# Patient Record
Sex: FEMALE | Race: BLACK OR AFRICAN AMERICAN | NOT HISPANIC OR LATINO | URBAN - METROPOLITAN AREA
[De-identification: names, ages, dates, MRNs, and addresses within clinical notes are randomized per-mention and may not be internally consistent; named-entity substitution may affect disease eponyms.]

---

## 2020-10-10 ENCOUNTER — EMERGENCY (EMERGENCY)
Facility: HOSPITAL | Age: 20
LOS: 1 days | Discharge: HOME | End: 2020-10-10
Attending: EMERGENCY MEDICINE | Admitting: EMERGENCY MEDICINE
Payer: COMMERCIAL

## 2020-10-10 VITALS
OXYGEN SATURATION: 98 % | RESPIRATION RATE: 18 BRPM | WEIGHT: 110.01 LBS | SYSTOLIC BLOOD PRESSURE: 127 MMHG | TEMPERATURE: 98 F | HEART RATE: 92 BPM | DIASTOLIC BLOOD PRESSURE: 70 MMHG

## 2020-10-10 VITALS
HEART RATE: 95 BPM | RESPIRATION RATE: 18 BRPM | TEMPERATURE: 98 F | DIASTOLIC BLOOD PRESSURE: 77 MMHG | OXYGEN SATURATION: 95 % | SYSTOLIC BLOOD PRESSURE: 113 MMHG

## 2020-10-10 DIAGNOSIS — N93.9 ABNORMAL UTERINE AND VAGINAL BLEEDING, UNSPECIFIED: ICD-10-CM

## 2020-10-10 DIAGNOSIS — R10.2 PELVIC AND PERINEAL PAIN: ICD-10-CM

## 2020-10-10 LAB
ALBUMIN SERPL ELPH-MCNC: 4.5 G/DL — SIGNIFICANT CHANGE UP (ref 3.3–5)
ALP SERPL-CCNC: 61 U/L — SIGNIFICANT CHANGE UP (ref 40–120)
ALT FLD-CCNC: 18 U/L — SIGNIFICANT CHANGE UP (ref 10–45)
ANION GAP SERPL CALC-SCNC: 13 MMOL/L — SIGNIFICANT CHANGE UP (ref 5–17)
APPEARANCE UR: CLEAR — SIGNIFICANT CHANGE UP
AST SERPL-CCNC: 24 U/L — SIGNIFICANT CHANGE UP (ref 10–40)
BASOPHILS # BLD AUTO: 0.03 K/UL — SIGNIFICANT CHANGE UP (ref 0–0.2)
BASOPHILS NFR BLD AUTO: 0.6 % — SIGNIFICANT CHANGE UP (ref 0–2)
BILIRUB SERPL-MCNC: 0.3 MG/DL — SIGNIFICANT CHANGE UP (ref 0.2–1.2)
BILIRUB UR-MCNC: NEGATIVE — SIGNIFICANT CHANGE UP
BUN SERPL-MCNC: 8 MG/DL — SIGNIFICANT CHANGE UP (ref 7–23)
CALCIUM SERPL-MCNC: 9.2 MG/DL — SIGNIFICANT CHANGE UP (ref 8.4–10.5)
CHLORIDE SERPL-SCNC: 106 MMOL/L — SIGNIFICANT CHANGE UP (ref 96–108)
CO2 SERPL-SCNC: 20 MMOL/L — LOW (ref 22–31)
COLOR SPEC: YELLOW — SIGNIFICANT CHANGE UP
CREAT SERPL-MCNC: 0.7 MG/DL — SIGNIFICANT CHANGE UP (ref 0.5–1.3)
DIFF PNL FLD: ABNORMAL
EOSINOPHIL # BLD AUTO: 0.25 K/UL — SIGNIFICANT CHANGE UP (ref 0–0.5)
EOSINOPHIL NFR BLD AUTO: 4.7 % — SIGNIFICANT CHANGE UP (ref 0–6)
GLUCOSE SERPL-MCNC: 115 MG/DL — HIGH (ref 70–99)
GLUCOSE UR QL: NEGATIVE — SIGNIFICANT CHANGE UP
HCG SERPL-ACNC: <0 MIU/ML — SIGNIFICANT CHANGE UP
HCT VFR BLD CALC: 36.9 % — SIGNIFICANT CHANGE UP (ref 34.5–45)
HGB BLD-MCNC: 11.4 G/DL — LOW (ref 11.5–15.5)
IMM GRANULOCYTES NFR BLD AUTO: 0.2 % — SIGNIFICANT CHANGE UP (ref 0–1.5)
KETONES UR-MCNC: 15 MG/DL
LEUKOCYTE ESTERASE UR-ACNC: NEGATIVE — SIGNIFICANT CHANGE UP
LYMPHOCYTES # BLD AUTO: 1.47 K/UL — SIGNIFICANT CHANGE UP (ref 1–3.3)
LYMPHOCYTES # BLD AUTO: 27.7 % — SIGNIFICANT CHANGE UP (ref 13–44)
MCHC RBC-ENTMCNC: 26 PG — LOW (ref 27–34)
MCHC RBC-ENTMCNC: 30.9 GM/DL — LOW (ref 32–36)
MCV RBC AUTO: 84.2 FL — SIGNIFICANT CHANGE UP (ref 80–100)
MONOCYTES # BLD AUTO: 0.36 K/UL — SIGNIFICANT CHANGE UP (ref 0–0.9)
MONOCYTES NFR BLD AUTO: 6.8 % — SIGNIFICANT CHANGE UP (ref 2–14)
NEUTROPHILS # BLD AUTO: 3.18 K/UL — SIGNIFICANT CHANGE UP (ref 1.8–7.4)
NEUTROPHILS NFR BLD AUTO: 60 % — SIGNIFICANT CHANGE UP (ref 43–77)
NITRITE UR-MCNC: NEGATIVE — SIGNIFICANT CHANGE UP
NRBC # BLD: 0 /100 WBCS — SIGNIFICANT CHANGE UP (ref 0–0)
PH UR: 7 — SIGNIFICANT CHANGE UP (ref 5–8)
PLATELET # BLD AUTO: 180 K/UL — SIGNIFICANT CHANGE UP (ref 150–400)
POTASSIUM SERPL-MCNC: 4 MMOL/L — SIGNIFICANT CHANGE UP (ref 3.5–5.3)
POTASSIUM SERPL-SCNC: 4 MMOL/L — SIGNIFICANT CHANGE UP (ref 3.5–5.3)
PROT SERPL-MCNC: 7.8 G/DL — SIGNIFICANT CHANGE UP (ref 6–8.3)
PROT UR-MCNC: NEGATIVE MG/DL — SIGNIFICANT CHANGE UP
RBC # BLD: 4.38 M/UL — SIGNIFICANT CHANGE UP (ref 3.8–5.2)
RBC # FLD: 17.3 % — HIGH (ref 10.3–14.5)
SODIUM SERPL-SCNC: 139 MMOL/L — SIGNIFICANT CHANGE UP (ref 135–145)
SP GR SPEC: 1.02 — SIGNIFICANT CHANGE UP (ref 1–1.03)
UROBILINOGEN FLD QL: 0.2 E.U./DL — SIGNIFICANT CHANGE UP
WBC # BLD: 5.3 K/UL — SIGNIFICANT CHANGE UP (ref 3.8–10.5)
WBC # FLD AUTO: 5.3 K/UL — SIGNIFICANT CHANGE UP (ref 3.8–10.5)

## 2020-10-10 PROCEDURE — 80053 COMPREHEN METABOLIC PANEL: CPT

## 2020-10-10 PROCEDURE — 36415 COLL VENOUS BLD VENIPUNCTURE: CPT

## 2020-10-10 PROCEDURE — 99285 EMERGENCY DEPT VISIT HI MDM: CPT

## 2020-10-10 PROCEDURE — 85025 COMPLETE CBC W/AUTO DIFF WBC: CPT

## 2020-10-10 PROCEDURE — 76856 US EXAM PELVIC COMPLETE: CPT | Mod: 26,59

## 2020-10-10 PROCEDURE — 96374 THER/PROPH/DIAG INJ IV PUSH: CPT

## 2020-10-10 PROCEDURE — 99284 EMERGENCY DEPT VISIT MOD MDM: CPT | Mod: 25

## 2020-10-10 PROCEDURE — 81001 URINALYSIS AUTO W/SCOPE: CPT

## 2020-10-10 PROCEDURE — 84702 CHORIONIC GONADOTROPIN TEST: CPT

## 2020-10-10 PROCEDURE — 76830 TRANSVAGINAL US NON-OB: CPT | Mod: 26

## 2020-10-10 PROCEDURE — 96375 TX/PRO/DX INJ NEW DRUG ADDON: CPT

## 2020-10-10 PROCEDURE — 76856 US EXAM PELVIC COMPLETE: CPT

## 2020-10-10 PROCEDURE — 76830 TRANSVAGINAL US NON-OB: CPT

## 2020-10-10 RX ORDER — ONDANSETRON 8 MG/1
4 TABLET, FILM COATED ORAL ONCE
Refills: 0 | Status: COMPLETED | OUTPATIENT
Start: 2020-10-10 | End: 2020-10-10

## 2020-10-10 RX ORDER — SODIUM CHLORIDE 9 MG/ML
1000 INJECTION INTRAMUSCULAR; INTRAVENOUS; SUBCUTANEOUS ONCE
Refills: 0 | Status: COMPLETED | OUTPATIENT
Start: 2020-10-10 | End: 2020-10-10

## 2020-10-10 RX ORDER — KETOROLAC TROMETHAMINE 30 MG/ML
30 SYRINGE (ML) INJECTION ONCE
Refills: 0 | Status: DISCONTINUED | OUTPATIENT
Start: 2020-10-10 | End: 2020-10-10

## 2020-10-10 RX ORDER — IBUPROFEN 200 MG
1 TABLET ORAL
Qty: 30 | Refills: 0
Start: 2020-10-10

## 2020-10-10 RX ADMIN — ONDANSETRON 4 MILLIGRAM(S): 8 TABLET, FILM COATED ORAL at 09:52

## 2020-10-10 RX ADMIN — SODIUM CHLORIDE 1000 MILLILITER(S): 9 INJECTION INTRAMUSCULAR; INTRAVENOUS; SUBCUTANEOUS at 09:52

## 2020-10-10 RX ADMIN — Medication 30 MILLIGRAM(S): at 09:52

## 2020-10-10 NOTE — ED ADULT TRIAGE NOTE - CHIEF COMPLAINT QUOTE
c/o that her period cramps is worst today.  Day1 of  period today.  Patient uncooperative, throwing herself on the floor in triage.

## 2020-10-10 NOTE — ED PROVIDER NOTE - CLINICAL SUMMARY MEDICAL DECISION MAKING FREE TEXT BOX
Pt with cramping - hx of heavy cramps during period - no prior US - mod distress upon arrival throwing herself onto floor and in pain - labs, iv toradol and zofran given - pt feels much improved.  Requesting ultrasound because was suppose to have as outpt but has not yet scheduled. Pt with cramping - hx of heavy cramps during period - no prior US - mod distress upon arrival throwing herself onto floor and in pain - labs, iv toradol and zofran given - pt feels much improved.  Requesting ultrasound because was suppose to have as outpt but has not yet scheduled.  US neg for acute findings.  Pt feels much better at discharge.  Will follow up with gyn.

## 2020-10-10 NOTE — ED PROVIDER NOTE - PATIENT PORTAL LINK FT
You can access the FollowMyHealth Patient Portal offered by [facility] by registering at the following website: [email or web address]. By joining [employer]’s FollowMyHealth portal, you will also be able to view your health information using other applications (apps) compatible with our system.

## 2020-10-10 NOTE — ED PROVIDER NOTE - CCCP TRG CHIEF CMPLNT
Name of caller:   Paige  Relationship to pt:  mother  Reason for call:   Mother wondering if she can get a note for work to stay home with Bree for the week to get her over the withdrawal    Best phone number to reach pt at is:   [phone]  Ok to leave a message with medical info?   yes     abdominal pain

## 2020-10-10 NOTE — ED ADULT NURSE NOTE - OBJECTIVE STATEMENT
Patient alert and oriented x 3 came c/o sudden onset of lower abdominal pain started this am , pt. reported started menstrual period this am and she usually having the same pain for 3 days everytime she has her period that relieves by taking marijuana but this time she just took tylenol this am with no relief . Pt. also c/o nausea and vomiting . Seen and examined by Dr. feldman , medicated for the pain , feels better at this time . Will continue to monitor .

## 2020-10-10 NOTE — ED PROVIDER NOTE - NSFOLLOWUPINSTRUCTIONS_ED_ALL_ED_FT
Take ibuprofen as prescribed starting one day before expecting period.    Follow up with your GYN doctor to discuss painful menstrual cramps.          DYSMENORRHEA - General Information           Dysmenorrhea    WHAT YOU NEED TO KNOW:    What is dysmenorrhea? Dysmenorrhea is painful menstrual cramps at or around the time of your monthly period.    What causes dysmenorrhea? Your body normally produces chemicals each month to help your uterus contract. When too many of these chemicals are made, your uterus contracts too much and causes pain. Dysmenorrhea may also be caused by any of the following:   •Abnormal structure of your uterus or vagina      •A narrow cervix      •Growth in or on your uterus or ovaries      •Medical conditions, such as pelvic inflammatory disease, endometriosis, or uterine fibroids      •A copper intrauterine device (IUD)      What increases my risk for dysmenorrhea?   •Never been pregnant      •Obesity      •Smoking      •Family history of painful menstrual cramps      •Pelvic infection      •Longer monthly period cycle      •Medical conditions, such as a sexually transmitted infection or endometriosis      What are the signs and symptoms of dysmenorrhea?   •Mild to severe pain      •Cramping pain in lower abdomen or low back      •Bloating      •Headache      •Diarrhea      How is dysmenorrhea diagnosed? Your healthcare provider can usually diagnose dysmenorrhea by your signs and symptoms. Tell him when your symptoms started and if you have pain between your monthly periods. He may ask if anything relieves your pain, such as heat or medicine. Tell your healthcare provider if you are sexually active or have ever been pregnant. You may need any of the following:   •A blood test will check for pregnancy.      •A pelvic exam may be needed to check the size and shape of your uterus and ovaries. Your healthcare provider gently inserts a warmed speculum into your vagina. A speculum is a tool that opens your vagina to show your cervix.      •A cervical culture may be needed to check for infection. Your healthcare provider will use a swab to collect a sample of cells from your cervix. This will be sent to a lab for tests.      •An ultrasound will show abnormal structure of your reproductive organs. Sound waves are used to show pictures on a monitor.      How is dysmenorrhea treated? Dysmenorrhea can be controlled with lifestyle changes and medicines. It usually improves with age and pregnancy.  •Medicines: ?NSAIDs help decrease swelling and pain or fever. This medicine is available with or without a doctor's order. NSAIDs can cause stomach bleeding or kidney problems in certain people. If you take blood thinner medicine, always ask your healthcare provider if NSAIDs are safe for you. Always read the medicine label and follow directions.      ?Birth control medicine may help decrease your pain. This medicine may be birth control pills or an IUD that does not contain copper.      •Transcutaneous electric nerve stimulation (TENS), is a device used to stimulate your nerves and decrease pain. Ask your healthcare provider for more information about TENS.      How can I manage my symptoms?   •Eat low-fat foods. Increase the amount of vegetables and raw seeds you eat. Ask your healthcare provider if you should take vitamin B or magnesium supplements. These will help decrease your pain. Do not eat dairy products or eggs.      •Apply heat on your lower abdomen for 20 to 30 minutes every 2 hours for as many days as directed. Heat helps decrease pain and muscle spasms.      •Manage your stress. Stress can make your symptoms worse. Try relaxation exercises, such as deep breathing.      •Exercise regularly. Ask your healthcare provider about the best exercise plan for you. Exercise can help decrease pain.       •Keep a record of your pain. Write down when your pain and periods start and stop. Bring the record with you to your follow-up visits.       •Do not smoke. Avoid others who smoke. If you smoke, it is never too late to quit. Smoking can increase your risk for dysmenorrhea. Ask your healthcare provider for information if you need help quitting.      When should I contact my healthcare provider?   •You have anxiety or feel depressed.      •Your periods are early, late, or more painful than usual.      •You have questions or concerns about your condition or care.      When should I seek immediate care or call 911?   •You have severe pain.      •You have heavy vaginal bleeding and you feel faint.      •You have sudden chest pain and trouble breathing.      CARE AGREEMENT:    You have the right to help plan your care. Learn about your health condition and how it may be treated. Discuss treatment options with your healthcare providers to decide what care you want to receive. You always have the right to refuse treatment.        © Copyright [employer] 2020           back to top                          © Copyright [employer] 2020

## 2020-10-10 NOTE — ED ADULT NURSE NOTE - CHPI ED NUR SYMPTOMS NEG
no burning urination/no blood in stool/no chills/no fever/no dysuria/no abdominal distension/no diarrhea/no hematuria

## 2022-06-08 NOTE — ED PROVIDER NOTE - OBJECTIVE STATEMENT
19 y/o f with PMH of painful menstruation presents with lower abd cramping on first day of menstruation.  Describes it as lower abd cramping, not one sided, no dysuria, no discharge.  Sexually active with one partner.  Took tylenol with minimal relief.  Has not had pelvic US in past.  Denies fever or infectious symptoms. Pt vomited once - she states she vomits from pain.  Normal bm.  No hx of abd surgery.  Pt has gyn at [facility] [address].
2

## 2022-09-12 ENCOUNTER — EMERGENCY (EMERGENCY)
Facility: HOSPITAL | Age: 22
LOS: 1 days | Discharge: HOME | End: 2022-09-12
Admitting: EMERGENCY MEDICINE

## 2022-09-12 VITALS
SYSTOLIC BLOOD PRESSURE: 100 MMHG | HEART RATE: 76 BPM | RESPIRATION RATE: 20 BRPM | OXYGEN SATURATION: 100 % | TEMPERATURE: 98 F | DIASTOLIC BLOOD PRESSURE: 64 MMHG

## 2022-09-12 VITALS
SYSTOLIC BLOOD PRESSURE: 102 MMHG | HEART RATE: 75 BPM | DIASTOLIC BLOOD PRESSURE: 68 MMHG | OXYGEN SATURATION: 100 % | WEIGHT: 104.06 LBS | TEMPERATURE: 98 F | RESPIRATION RATE: 19 BRPM

## 2022-09-12 PROBLEM — D64.9 ANEMIA, UNSPECIFIED: Chronic | Status: ACTIVE | Noted: 2020-10-10

## 2022-09-12 LAB — HCG UR QL: NEGATIVE — SIGNIFICANT CHANGE UP

## 2022-09-12 PROCEDURE — 99284 EMERGENCY DEPT VISIT MOD MDM: CPT

## 2022-09-12 RX ORDER — KETOROLAC TROMETHAMINE 30 MG/ML
30 SYRINGE (ML) INJECTION ONCE
Refills: 0 | Status: DISCONTINUED | OUTPATIENT
Start: 2022-09-12 | End: 2022-09-12

## 2022-09-12 RX ORDER — ACETAMINOPHEN 500 MG
650 TABLET ORAL ONCE
Refills: 0 | Status: COMPLETED | OUTPATIENT
Start: 2022-09-12 | End: 2022-09-12

## 2022-09-12 RX ADMIN — Medication 650 MILLIGRAM(S): at 14:35

## 2022-09-12 NOTE — ED ADULT NURSE NOTE - NSIMPLEMENTINTERV_GEN_ALL_ED
Implemented All Fall Risk Interventions:  [place] to call system. Call bell, personal items and telephone within reach. Instruct patient to call for assistance. Room bathroom lighting operational. Non-slip footwear when patient is off stretcher. Physically safe environment: no spills, clutter or unnecessary equipment. Stretcher in lowest position, wheels locked, appropriate side rails in place. Provide visual cue, wrist band, yellow gown, etc. Monitor gait and stability. Monitor for mental status changes and reorient to person, place, and time. Review medications for side effects contributing to fall risk. Reinforce activity limits and safety measures with patient and family.

## 2022-09-12 NOTE — ED ADULT NURSE NOTE - OBJECTIVE STATEMENT
Pt with strange affect, appears intoxicated but denies. Pt reports abrupt onset of menstrual pain this morning, thinks she took medication but can't remember what time. Pt unwilling to sit still or upright for proper assessment.

## 2022-09-12 NOTE — ED PROVIDER NOTE - CLINICAL SUMMARY MEDICAL DECISION MAKING FREE TEXT BOX
23 yo F with PMHx of anemia presenting for menstrual cramps today. Physical exam was unremarkable. Will provide Tylenol and Toradol for pain relief. Will also check urine for pregnancy.

## 2022-09-12 NOTE — ED PROVIDER NOTE - PATIENT PORTAL LINK FT
You can access the FollowMyHealth Patient Portal offered by [facility] by registering at the following website: [email or web address]. By joining [employer]’s FollowMyHealth portal, you will also be able to view your health information using other applications (apps) compatible with our system.

## 2022-09-12 NOTE — ED PROVIDER NOTE - OBJECTIVE STATEMENT
21 yo F with PMHx of anemia presenting for menstrual cramps today. Is on the first day of her cycle. Pt not on birth control. Reports having unprotected sexual intercourse 2 months ago. Pt reports previously going to [facility] for imaging and was told that the pain is from bad menstrual cramps. Pt states the pain is normal for her menstruation. Denies N/V/D or fevers/chills.

## 2022-09-15 DIAGNOSIS — Z86.2 PERSONAL HISTORY OF DISEASES OF THE BLOOD AND BLOOD-FORMING ORGANS AND CERTAIN DISORDERS INVOLVING THE IMMUNE MECHANISM: ICD-10-CM

## 2022-09-15 DIAGNOSIS — R10.2 PELVIC AND PERINEAL PAIN: ICD-10-CM

## 2023-05-22 ENCOUNTER — EMERGENCY (EMERGENCY)
Facility: HOSPITAL | Age: 23
LOS: 1 days | Discharge: HOME | End: 2023-05-22
Admitting: EMERGENCY MEDICINE
Payer: COMMERCIAL

## 2023-05-22 VITALS
TEMPERATURE: 98 F | HEART RATE: 66 BPM | RESPIRATION RATE: 18 BRPM | WEIGHT: 110.01 LBS | OXYGEN SATURATION: 100 % | HEIGHT: 66 IN | SYSTOLIC BLOOD PRESSURE: 117 MMHG | DIASTOLIC BLOOD PRESSURE: 77 MMHG

## 2023-05-22 DIAGNOSIS — R10.30 LOWER ABDOMINAL PAIN, UNSPECIFIED: ICD-10-CM

## 2023-05-22 DIAGNOSIS — N94.6 DYSMENORRHEA, UNSPECIFIED: ICD-10-CM

## 2023-05-22 DIAGNOSIS — Z86.2 PERSONAL HISTORY OF DISEASES OF THE BLOOD AND BLOOD-FORMING ORGANS AND CERTAIN DISORDERS INVOLVING THE IMMUNE MECHANISM: ICD-10-CM

## 2023-05-22 LAB
ALBUMIN SERPL ELPH-MCNC: 4.2 G/DL — SIGNIFICANT CHANGE UP (ref 3.4–5)
ALP SERPL-CCNC: 63 U/L — SIGNIFICANT CHANGE UP (ref 40–120)
ALT FLD-CCNC: 17 U/L — SIGNIFICANT CHANGE UP (ref 12–42)
ANION GAP SERPL CALC-SCNC: 10 MMOL/L — SIGNIFICANT CHANGE UP (ref 9–16)
ANISOCYTOSIS BLD QL: SLIGHT — SIGNIFICANT CHANGE UP
APPEARANCE UR: CLEAR — SIGNIFICANT CHANGE UP
AST SERPL-CCNC: 21 U/L — SIGNIFICANT CHANGE UP (ref 15–37)
BACTERIA # UR AUTO: PRESENT /HPF
BASOPHILS # BLD AUTO: 0.02 K/UL — SIGNIFICANT CHANGE UP (ref 0–0.2)
BASOPHILS NFR BLD AUTO: 0.3 % — SIGNIFICANT CHANGE UP (ref 0–2)
BILIRUB SERPL-MCNC: 0.5 MG/DL — SIGNIFICANT CHANGE UP (ref 0.2–1.2)
BILIRUB UR-MCNC: ABNORMAL
BUN SERPL-MCNC: 10 MG/DL — SIGNIFICANT CHANGE UP (ref 7–23)
CALCIUM SERPL-MCNC: 9.1 MG/DL — SIGNIFICANT CHANGE UP (ref 8.5–10.5)
CHLORIDE SERPL-SCNC: 106 MMOL/L — SIGNIFICANT CHANGE UP (ref 96–108)
CO2 SERPL-SCNC: 23 MMOL/L — SIGNIFICANT CHANGE UP (ref 22–31)
COLOR SPEC: YELLOW — SIGNIFICANT CHANGE UP
CREAT SERPL-MCNC: 0.85 MG/DL — SIGNIFICANT CHANGE UP (ref 0.5–1.3)
DACRYOCYTES BLD QL SMEAR: SLIGHT — SIGNIFICANT CHANGE UP
DIFF PNL FLD: ABNORMAL
EGFR: 99 ML/MIN/1.73M2 — SIGNIFICANT CHANGE UP
EOSINOPHIL # BLD AUTO: 0.09 K/UL — SIGNIFICANT CHANGE UP (ref 0–0.5)
EOSINOPHIL NFR BLD AUTO: 1.4 % — SIGNIFICANT CHANGE UP (ref 0–6)
EPI CELLS # UR: ABNORMAL /HPF (ref 0–5)
GLUCOSE SERPL-MCNC: 94 MG/DL — SIGNIFICANT CHANGE UP (ref 70–99)
GLUCOSE UR QL: NEGATIVE — SIGNIFICANT CHANGE UP
HCG UR QL: NEGATIVE — SIGNIFICANT CHANGE UP
HCT VFR BLD CALC: 41.3 % — SIGNIFICANT CHANGE UP (ref 34.5–45)
HGB BLD-MCNC: 12.9 G/DL — SIGNIFICANT CHANGE UP (ref 11.5–15.5)
IMM GRANULOCYTES NFR BLD AUTO: 0.2 % — SIGNIFICANT CHANGE UP (ref 0–0.9)
KETONES UR-MCNC: 15 MG/DL
LEUKOCYTE ESTERASE UR-ACNC: NEGATIVE — SIGNIFICANT CHANGE UP
LYMPHOCYTES # BLD AUTO: 1.43 K/UL — SIGNIFICANT CHANGE UP (ref 1–3.3)
LYMPHOCYTES # BLD AUTO: 22.6 % — SIGNIFICANT CHANGE UP (ref 13–44)
MACROCYTES BLD QL: SIGNIFICANT CHANGE UP
MANUAL SMEAR VERIFICATION: SIGNIFICANT CHANGE UP
MCHC RBC-ENTMCNC: 26.8 PG — LOW (ref 27–34)
MCHC RBC-ENTMCNC: 31.2 GM/DL — LOW (ref 32–36)
MCV RBC AUTO: 85.9 FL — SIGNIFICANT CHANGE UP (ref 80–100)
MONOCYTES # BLD AUTO: 0.37 K/UL — SIGNIFICANT CHANGE UP (ref 0–0.9)
MONOCYTES NFR BLD AUTO: 5.8 % — SIGNIFICANT CHANGE UP (ref 2–14)
NEUTROPHILS # BLD AUTO: 4.41 K/UL — SIGNIFICANT CHANGE UP (ref 1.8–7.4)
NEUTROPHILS NFR BLD AUTO: 69.7 % — SIGNIFICANT CHANGE UP (ref 43–77)
NITRITE UR-MCNC: NEGATIVE — SIGNIFICANT CHANGE UP
NRBC # BLD: 0 /100 WBCS — SIGNIFICANT CHANGE UP (ref 0–0)
PH UR: 6.5 — SIGNIFICANT CHANGE UP (ref 5–8)
PLAT MORPH BLD: NORMAL — SIGNIFICANT CHANGE UP
PLATELET # BLD AUTO: 160 K/UL — SIGNIFICANT CHANGE UP (ref 150–400)
POTASSIUM SERPL-MCNC: 3.8 MMOL/L — SIGNIFICANT CHANGE UP (ref 3.5–5.3)
POTASSIUM SERPL-SCNC: 3.8 MMOL/L — SIGNIFICANT CHANGE UP (ref 3.5–5.3)
PROT SERPL-MCNC: 8.3 G/DL — HIGH (ref 6.4–8.2)
PROT UR-MCNC: NEGATIVE MG/DL — SIGNIFICANT CHANGE UP
RBC # BLD: 4.81 M/UL — SIGNIFICANT CHANGE UP (ref 3.8–5.2)
RBC # FLD: 20.6 % — HIGH (ref 10.3–14.5)
RBC BLD AUTO: ABNORMAL
RBC CASTS # UR COMP ASSIST: > 10 /HPF
SODIUM SERPL-SCNC: 139 MMOL/L — SIGNIFICANT CHANGE UP (ref 132–145)
SP GR SPEC: >=1.03 — SIGNIFICANT CHANGE UP (ref 1–1.03)
UROBILINOGEN FLD QL: 0.2 E.U./DL — SIGNIFICANT CHANGE UP
WBC # BLD: 6.33 K/UL — SIGNIFICANT CHANGE UP (ref 3.8–10.5)
WBC # FLD AUTO: 6.33 K/UL — SIGNIFICANT CHANGE UP (ref 3.8–10.5)
WBC UR QL: < 5 /HPF — SIGNIFICANT CHANGE UP

## 2023-05-22 PROCEDURE — 99284 EMERGENCY DEPT VISIT MOD MDM: CPT

## 2023-05-22 RX ORDER — ACETAMINOPHEN 500 MG
975 TABLET ORAL ONCE
Refills: 0 | Status: COMPLETED | OUTPATIENT
Start: 2023-05-22 | End: 2023-05-22

## 2023-05-22 RX ORDER — KETOROLAC TROMETHAMINE 30 MG/ML
15 SYRINGE (ML) INJECTION ONCE
Refills: 0 | Status: DISCONTINUED | OUTPATIENT
Start: 2023-05-22 | End: 2023-05-22

## 2023-05-22 RX ORDER — SODIUM CHLORIDE 9 MG/ML
1000 INJECTION INTRAMUSCULAR; INTRAVENOUS; SUBCUTANEOUS ONCE
Refills: 0 | Status: COMPLETED | OUTPATIENT
Start: 2023-05-22 | End: 2023-05-22

## 2023-05-22 RX ADMIN — SODIUM CHLORIDE 2000 MILLILITER(S): 9 INJECTION INTRAMUSCULAR; INTRAVENOUS; SUBCUTANEOUS at 11:59

## 2023-05-22 RX ADMIN — Medication 15 MILLIGRAM(S): at 12:03

## 2023-05-22 RX ADMIN — Medication 975 MILLIGRAM(S): at 11:58

## 2023-05-22 NOTE — ED PROVIDER NOTE - NSFOLLOWUPINSTRUCTIONS_ED_ALL_ED_FT
Painful menstruation, also known as dysmenorrhea, is a common problem. For most people who are affected, dysmenorrhea begins during adolescence, usually within four to five years of the first menstrual period. Painful periods become less common over time, as a person gets older.    This topic review discusses the causes, symptoms diagnosis, and treatment of dysmenorrhea in people who do not have an underlying cause for their pain (eg, endometriosis, uterine fibroids, uterine adenomyosis, bowel or bladder disease, etc). Information about these problems is available separately. (See "Patient education: Endometriosis (Beyond the Basics)" and "Patient education: Uterine fibroids (Beyond the Basics)" and "Patient education: Chronic pelvic pain in females (Beyond the Basics)".)    CAUSE OF DYSMENORRHEA  During menstruation, chemicals called "prostaglandins" form in the lining of the uterus. They cause muscle contractions in the uterus, which can trigger pain and decrease blood flow and oxygen to the uterus. Similar to labor pains, these contractions can cause significant pain and discomfort.    Prostaglandins may also contribute to other menstrual symptoms such as nausea and diarrhea.    DYSMENORRHEA SYMPTOMS  The pain of dysmenorrhea is crampy and usually located in lower abdomen; some people also have severe pain in the back or thighs. The pain usually begins just before or as menstrual bleeding begins, and gradually improves over one to three days. Pain usually occurs intermittently, and can range from mild to disabling.    Other symptoms that may accompany cramping include nausea, diarrhea, dizziness, fatigue, headache, or a flu-like feeling.    DYSMENORRHEA DIAGNOSIS  To diagnose dysmenorrhea, your health care provider will review your medical history and do a physical exam.    Physical examination — This involves a complete abdominal and pelvic examination. During the examination, your provider will observe and feel the size and shape of your vagina, cervix, and uterus; they will also attempt to feel the ovaries.    An internal pelvic examination may not be necessary in adolescents.    Other tests — Depending on your situation, your provider may also do a pelvic ultrasound (performed vaginally if possible). This can be useful in determining if you have another condition that can cause pain, such as uterine fibroids, uterine adenomyosis, or endometriosis.    INITIAL TREATMENT  There are a number of treatments available for dysmenorrhea.    Nonsteroidal anti-inflammatory drugs (NSAIDs) — NSAIDs are a group of medications that are very effective in reducing pain associated with dysmenorrhea. Some NSAIDs are available without a prescription, such as ibuprofen (sample brand names: Advil, Motrin) and naproxen (sample brand name: Aleve); others require a prescription. Prescription NSAIDs are probably no more effective than nonprescription NSAIDs as long as an adequate dose is used.    NSAIDs are most effective if they are started as soon as bleeding or other menstrual symptoms begins, and then taken on a regular schedule for two to three days. Your health care provider can talk to you about the best dose and schedule for you.    Birth control — For people who do not wish to get pregnant, birth control pills and other forms of hormonal birth control can also be used to treat dysmenorrhea. In addition to the pill, these include the patch, vaginal ring, injection, hormone-releasing intrauterine device (IUD), and implant. Using hormonal birth control usually relieves dysmenorrhea within several months of starting it.    These methods work by thinning the lining of the uterus, where prostaglandins are formed, thereby decreasing the uterine contractions and menstrual bleeding that contribute to pain and cramping. Some people choose to use hormonal birth control along with NSAIDs to manage pain.    If you are interested in hormonal birth control, talk with your health care provider. Each method has benefits and downsides, and the right choice for you will depend on these factors and your preferences. (See "Patient education: Hormonal methods of birth control (Beyond the Basics)" and "Patient education: Long-acting methods of birth control (Beyond the Basics)".)    Continuous dosing — Traditionally, hormonal birth control treatments such as the pill, patch, or ring are taken on a schedule that will trigger bleeding about once a month. However, it is possible to take your birth control continuously in order to avoid bleeding and minimize period-related pain. This is known as "continuous dosing."    Continuous dosing means the following:    ?Pill – If you use birth control pills, you would take one "active" pill per day for 21 or 24 days (depending upon the brand of pill), and then start a new pack of pills immediately. This can be done indefinitely, although many people stop taking their pill for several days every 9 to 12 weeks; many people will have some bleeding during this time.    ?Patch – If you use the patch (sample brand names: Xulane, Twirla), you would apply a new patch once per week for 9 to 12 weeks, and then use no patch for several days. Most people will have some bleeding during this time.    ?Ring – If you use the vaginal ring (sample brand names: Nuvaring, EluRyng, Annovera), you would insert a new ring every three to four weeks for 9 to 12 weeks, and then use no ring for several days. Most people will have some bleeding during this time.    ?Injections – If you get injections of medroxyprogesterone acetate (brand name Depo-Provera), you would get one injection every 12 weeks. Most people have some intermittent spotting or bleeding for the first few months; this usually decreases with time. However, after receiving four or more injections (one year or more of use), most people have little to no bleeding.    People who use continuous dosing of a hormonal birth control method often have intermittent light bleeding or spotting, especially during the first two to three months; this usually declines with time. When bleeding occurs, it is usually lighter and associated with less severe cramping compared with before the treatment.    Nondrug treatments — Treatments that do not require the use of a medication can also help to reduce the pain of dysmenorrhea. In some cases, these treatments are not as effective as medications, although they can be combined with a medication to increase the pain-relief benefit.    Heat — Applying heat to the lower abdomen with a heating pad, hot water bottle, or self-heating patch can significantly reduce pain, often as well as treatment with an NSAID. It is important to avoid burning the skin with a heating pad or hot water bottle that is too hot; a temperature of approximately 104ºF (40ºC) is recommended. A risk of burns is generally not a concern if a self-heating patch is used. You can use heat as often as desired. Using heat in addition to an NSAID medication may speed the relief of pain [1].    Dietary, vitamin, and herbal treatments — A variety of dietary and vitamin therapies have been studied for the relief of dysmenorrhea [2]. However, these studies involved a small number of people and do not provide sufficient information regarding safety or efficacy. Based on limited evidence, most experts do not recommend dietary, vitamin, or herbal remedies for treating dysmenorrhea.    Physical activity — Exercise seems to reduce menstrual symptoms, including pain, in some studies [3]. Exercise has a number of benefits, so it is reasonable to try exercising to relieve period-related pain. Exercise does not have to be strenuous to be helpful. Even gentle forms of activity, like walking, are good for your health and can improve your mood. (See "Patient education: Exercise (Beyond the Basics)".)    Complementary or alternative medicine — There is some evidence that complementary medicine practices such as yoga or acupuncture are effective in reducing painful periods [4]. However, further study is needed to confirm the safety and efficacy of these treatments. Further information about complementary and alternative medicine is available from the National Center for Complementary and Integrative Health (https://[facility].gov/).    Transcutaneous electrical nerve stimulation — Transcutaneous electrical nerve stimulation (TENS) is a treatment that involves the use of electrode patches, which are applied to the skin near the area of pain. TENS has been used to treat pain caused by many conditions, and may help to reduce dysmenorrhea in some patients.    This treatment involves wearing a small battery pack on a belt, which generates a mild electrical current that passes to the electrodes. The electrical current is believed to stimulate the release of chemicals that block or reduce painful nerve impulses.    An analysis of several studies showed that TENS does not relieve pain as well as medications; however, it may be a useful alternative for people who cannot or prefer not to take pain-relieving medications [5].    IF INITIAL TREATMENT IS NOT SUCCESSFUL  As discussed above, the most effective treatments for dysmenorrhea include NSAIDs and/or hormonal birth control methods. If you try one of these treatments but it does not sufficiently relieve pain within two to three months, your health care provider might recommend switching treatments or combining approaches (for example, adding birth control if you already use NSAIDs, or vice versa).    Typical next steps — If neither NSAIDs nor hormonal birth control adequately improve pain, your provider can recommend next steps based on your age, symptoms, and other medical conditions. Options include:    ?Surgery to identify a cause – Diagnostic laparoscopy may be recommended to determine if endometriosis, or another condition, could be causing the pain. Usually performed in an operating room under general anesthesia, laparoscopy is a minimally invasive surgery that uses small incisions and a thin telescope with a camera to determine if there are signs of endometriosis or other abnormalities on or near the uterus, ovaries, or other areas inside the pelvis. Most people having laparoscopy can go home on the day of surgery.    ?Medication to suppress periods completely – If your provider thinks your pain is related to endometriosis, they might recommend treatment with a gonadotropin-releasing hormone (GnRH) agonist, such as nafarelin (brand name: Synarel) or leuprolide (brand name: Lupron), or a GnRH antagonist, such as elagolix (brand name: Orilissa) or relugolix (brand name: Myfembree). If dysmenorrhea improves within two to three months of starting treatment, this suggests that endometriosis probably was the cause.    These options are discussed in full detail in a separate topic review. (See "Patient education: Endometriosis (Beyond the Basics)".)    Nerve cutting surgery — At least two surgical procedures have been developed to treat dysmenorrhea. Both of these surgeries involve cutting or destroying the uterine nerves, which prevents the transmission of pain signals. However, these procedures have not been shown to provide long-term relief of pain. Furthermore, surgery may be associated with complications. These may be related to regrowth of nerves or pain signals being transferred by alternate routes [6]. As a result, surgical treatments for dysmenorrhea are generally not recommended.

## 2023-05-22 NOTE — ED PROVIDER NOTE - GASTROINTESTINAL [-], MLM
Detail Level: Simple Render Risk Assessment In Note?: yes Comment: ILK injections performed today (right upper back 0.4cc, right superior pubis 0.1cc, chest  0.3cc) Recommended ScarAway (apply to keloids and massage QHS) no abdominal pain/no diarrhea/no nausea/no vomiting

## 2023-05-22 NOTE — ED PROVIDER NOTE - PATIENT PORTAL LINK FT
You can access the FollowMyHealth Patient Portal offered by [facility] by registering at the following website: [email or web address]. By joining [employer]’s FollowMyHealth portal, you will also be able to view your health information using other applications (apps) compatible with our system.

## 2023-05-22 NOTE — ED ADULT NURSE NOTE - OBJECTIVE STATEMENT
Patient presents with complaints of lower abdominal pain, thigh pain, back pain and pelvic pain. Patient is on her 2nd day of menstrual cycle. h/o dysmenorrhea. Denies nausea, vomiting, fever, chills, dizziness. Patient was at work when severe pain started.

## 2023-05-22 NOTE — ED ADULT NURSE NOTE - NSFALLRISKINTERV_ED_ALL_ED
Assistance OOB with selected safe patient handling equipment if applicable/Assistance with ambulation/Communicate fall risk and risk factors to all staff, patient, and family/Monitor gait and stability/Provide visual cue: yellow wristband, yellow gown, etc/Reinforce activity limits and safety measures with patient and family/Call bell, personal items and telephone in reach/Instruct patient to call for assistance before getting out of bed/chair/stretcher/Non-slip footwear applied when patient is off stretcher/[place] to call system/Physically safe environment - no spills, clutter or unnecessary equipment/Purposeful Proactive Rounding/Room/bathroom lighting operational, light cord in reach

## 2023-05-22 NOTE — ED PROVIDER NOTE - NSFOLLOWUPCLINICS_GEN_ALL_ED_FT
Pt off of unit at Xray upon pm attempt at eval, will con't to f/u as appropriate.    [facility]  OBGYN  [address] [place] [address]  Phone: [phone]  Fax: [phone]

## 2023-05-22 NOTE — ED ADULT TRIAGE NOTE - CHIEF COMPLAINT QUOTE
BIBA from work c/o pain to her "abdomen, thighs, back, vagina and butt" starting last night. LMP 5/21.

## 2023-05-22 NOTE — ED PROVIDER NOTE - PHYSICAL EXAMINATION
VITAL SIGNS: I have reviewed nursing notes and confirm.   CONST: Well-developed; No apparent distress.  ENT: No nasal discharge; airway clear.  EYES: Sclera clear. Pupils round and symmetrical bilaterally.  CARD: S1, S2 normal; no murmurs, gallops, or rubs. Regular rate and rhythm.  RESP: No wheezes, rales or rhonchi. Breathing comfortably; speaking in full sentences.   ABD: Soft; non-distended; non-tender; no rebound or guarding.  : No CVA tenderness bilaterally.  MSK: No acute deformities noted to extremities. No tenderness to cervical/thoracic/lumbar spine to palpation.  NEURO: Alert, oriented. Speech is fluent and appropriate. Moving all extremities appropriately. No gross motor or sensory abnormalities.   SKIN: Skin is normal temperature; no diaphoresis; no pallor.   PSYCH: Cooperative. Appropriate mood, language, and behavior.

## 2023-05-22 NOTE — ED PROVIDER NOTE - OBJECTIVE STATEMENT
Hx of dysmenorrhoea, not sexually active, no pregnancy, last menstrual period May 21st, presents with lower abdominal cramping radiating to her vagina and upper thighs consistent with her normal dysmenorrhoea symptoms. Patient took ibuprofen 800 mg with mild improvement of symptoms. She last took ibuprofen at 2 AM today. Denies any abdominal pain, vaginal bleeding, discharge, dysuria, N/V.

## 2023-05-22 NOTE — ED PROVIDER NOTE - CLINICAL SUMMARY MEDICAL DECISION MAKING FREE TEXT BOX
Patient with dysmenorrhea, menses started yesterday with her typical menstrual symptoms. Will give IV hydration, Toradol, and continue monitoring.